# Patient Record
Sex: FEMALE | Race: WHITE | NOT HISPANIC OR LATINO | Employment: OTHER | URBAN - NONMETROPOLITAN AREA
[De-identification: names, ages, dates, MRNs, and addresses within clinical notes are randomized per-mention and may not be internally consistent; named-entity substitution may affect disease eponyms.]

---

## 2024-04-22 ENCOUNTER — OFFICE VISIT (OUTPATIENT)
Dept: URGENT CARE | Facility: CLINIC | Age: 79
End: 2024-04-22
Payer: MEDICARE

## 2024-04-22 VITALS
TEMPERATURE: 98.2 F | DIASTOLIC BLOOD PRESSURE: 84 MMHG | OXYGEN SATURATION: 98 % | RESPIRATION RATE: 20 BRPM | SYSTOLIC BLOOD PRESSURE: 188 MMHG | HEART RATE: 106 BPM

## 2024-04-22 DIAGNOSIS — M54.42 ACUTE LEFT-SIDED LOW BACK PAIN WITH LEFT-SIDED SCIATICA: Primary | ICD-10-CM

## 2024-04-22 PROCEDURE — G0463 HOSPITAL OUTPT CLINIC VISIT: HCPCS

## 2024-04-22 PROCEDURE — 99203 OFFICE O/P NEW LOW 30 MIN: CPT

## 2024-04-22 RX ORDER — METOPROLOL SUCCINATE 50 MG/1
50 TABLET, EXTENDED RELEASE ORAL DAILY
COMMUNITY
Start: 2024-02-08

## 2024-04-22 RX ORDER — METHYLPREDNISOLONE 4 MG/1
TABLET ORAL
Qty: 21 TABLET | Refills: 0 | Status: SHIPPED | OUTPATIENT
Start: 2024-04-22

## 2024-04-22 RX ORDER — BENAZEPRIL HYDROCHLORIDE 40 MG/1
40 TABLET ORAL DAILY
COMMUNITY
Start: 2024-02-08

## 2024-04-22 RX ORDER — CYCLOBENZAPRINE HCL 5 MG
5 TABLET ORAL 3 TIMES DAILY PRN
Qty: 28 TABLET | Refills: 0 | Status: SHIPPED | OUTPATIENT
Start: 2024-04-22

## 2024-04-22 RX ORDER — AMLODIPINE BESYLATE 10 MG/1
10 TABLET ORAL DAILY
COMMUNITY
Start: 2024-02-08

## 2024-04-22 NOTE — PROGRESS NOTES
[facility]        NAME: Holly Raygoza is a 78 y.o. female  : 1945    MRN: [id number]  DATE: 2024  TIME: 11:29 AM    Assessment and Plan   Acute left-sided low back pain with left-sided sciatica [M54.42]  1. Acute left-sided low back pain with left-sided sciatica  cyclobenzaprine (FLEXERIL) 5 mg tablet    methylPREDNISolone 4 MG tablet therapy pack    Ambulatory Referral to Physical Therapy        Left lower back pain radiating down left leg since Thursday of last week.  No relief with Tylenol and ibuprofen over-the-counter.  There is mild tenderness in the left lower lumbar region with positive straight leg raise on the left.  Equal sensation, strength, reflexes in bilateral lower extremities.  Will give trial of Flexeril and Medrol Dosepak.  Did give referral to physical therapy as well.  PT did have high blood pressure reading here in the clinic, she is on blood pressure medications per family doctor.  Patient states that is because she is very nervous and worked up over her back pain.  She does not like going to see doctors.  Advised to continue monitoring blood pressure at home and to go to the emergency room if continued elevation or if she experiences any dizziness, headache, visual changes, numbness/tingling, changes in speech.  Patient has been agreed with the plan of care and are willing to follow.    Patient Instructions     Take prescribed medication as instructed.  Muscle relaxer (Flexeril) may make you drowsy.  Do not take prior to driving or operating machinery.  Be cautious at home when moving around.  May take later in the evening.  Continue monitoring blood pressure.  Follow-up with your family doctor regarding elevated blood pressure here today.  May continue with TENS unit at home.  Warm compresses 4-5 times a day for 10 to 15 minutes to the left lower back.  Follow-up with physical therapy as discussed.  Follow up with PCP in 3-5 days.  Proceed to  ER if symptoms  worsen such as severe pain, worsening of numbness/tingling/weakness, bowel/bladder incontinence, headache, visual changes.    If tests are performed, our office will contact you with results only if changes need to made to the care plan discussed with you at the visit. You can review your full results on [facility].    Chief Complaint     Chief Complaint   Patient presents with    Back Pain     Down left leg started Thursday.  States can't walk.           History of Present Illness       78-year-old female here with  for left lower back pain with pain radiating down her left leg since Thursday of last week.  No recent injury or fall.   states that she was busy doing some yard work around the house last week.  Denies frequent back issues or prior back injuries/surgeries.  Taking Tylenol/ibuprofen over-the-counter.  Patient was doing TENS unit and applying ice.  Denies any fever, chills, chest pain, shortness of breath, urinary symptoms, bowel/bladder incontinence, saddle anesthesia.    Back Pain        Review of Systems   Review of Systems   Constitutional: Negative.    HENT: Negative.     Respiratory: Negative.     Cardiovascular: Negative.    Genitourinary: Negative.    Musculoskeletal:  Positive for back pain (with sciatica down left leg).   Skin: Negative.    Neurological: Negative.          Current Medications       Current Outpatient Medications:     amLODIPine (NORVASC) 10 mg tablet, Take 10 mg by mouth daily, Disp: , Rfl:     benazepril (LOTENSIN) 40 MG tablet, Take 40 mg by mouth daily, Disp: , Rfl:     cholecalciferol 1,000 units tablet, Take 1,000 Units by mouth daily, Disp: , Rfl:     cyclobenzaprine (FLEXERIL) 5 mg tablet, Take 1 tablet (5 mg total) by mouth 3 (three) times a day as needed for muscle spasms, Disp: 28 tablet, Rfl: 0    methylPREDNISolone 4 MG tablet therapy pack, Use as directed on package, Disp: 21 tablet, Rfl: 0    metoprolol succinate (TOPROL-XL) 50 mg 24 hr  tablet, Take 50 mg by mouth daily, Disp: , Rfl:     Current Allergies     Allergies as of 04/22/2024    (No Known Allergies)            The following portions of the patient's history were reviewed and updated as appropriate: allergies, current medications, past family history, past medical history, past social history, past surgical history and problem list.     Past Medical History:   Diagnosis Date    Vertigo        History reviewed. No pertinent surgical history.    No family history on file.      Medications have been verified.        Objective   BP (!) 188/84   Pulse (!) 106   Temp 98.2 °F (36.8 °C)   Resp 20   SpO2 98%        Physical Exam     Physical Exam  Constitutional:       General: She is not in acute distress.     Appearance: Normal appearance. She is not ill-appearing or diaphoretic.   HENT:      Head: Normocephalic and atraumatic.      Mouth/Throat:      Mouth: Mucous membranes are moist.      Pharynx: Oropharynx is clear.   Cardiovascular:      Rate and Rhythm: Normal rate and regular rhythm.      Pulses: Normal pulses.      Heart sounds: Normal heart sounds.   Pulmonary:      Effort: Pulmonary effort is normal. No respiratory distress.      Breath sounds: No stridor. No wheezing, rhonchi or rales.   Chest:      Chest wall: No tenderness.   Musculoskeletal:      Cervical back: Normal range of motion and neck supple. No rigidity or tenderness.      Lumbar back: Spasms and tenderness present. No swelling, edema, deformity, signs of trauma or lacerations. Decreased range of motion (Secondary to pain). Positive left straight leg raise test. Negative right straight leg raise test.        Back:       Comments: Equal sensation to light touch in bilateral lower extremities.  Normal range of motion and strength against resistance of the feet/ankles, knees, hips.  No rash.   Skin:     General: Skin is warm and dry.      Capillary Refill: Capillary refill takes less than 2 seconds.      Coloration: Skin  is not pale.      Findings: No bruising, erythema or rash.   Neurological:      General: No focal deficit present.      Mental Status: She is alert and oriented to person, place, and time.      Cranial Nerves: No cranial nerve deficit.      Sensory: No sensory deficit.      Motor: No weakness.      Coordination: Coordination normal.      Gait: Gait abnormal (sitting in wheel chair due to back pain/shooting pain down left leg).      Deep Tendon Reflexes: Reflexes normal.   Psychiatric:         Mood and Affect: Mood normal.         Behavior: Behavior normal.

## 2024-04-22 NOTE — PATIENT INSTRUCTIONS
Take prescribed medication as instructed.  Muscle relaxer (Flexeril) may make you drowsy.  Do not take prior to driving or operating machinery.  Be cautious at home when moving around.  May take later in the evening.  Continue monitoring blood pressure.  Follow-up with your family doctor regarding elevated blood pressure here today.  May continue with TENS unit at home.  Warm compresses 4-5 times a day for 10 to 15 minutes to the left lower back.  Follow-up with physical therapy as discussed.  Follow up with PCP in 3-5 days.  Proceed to  ER if symptoms worsen such as severe pain, worsening of numbness/tingling/weakness, bowel/bladder incontinence, headache, visual changes.    If tests are performed, our office will contact you with results only if changes need to made to the care plan discussed with you at the visit. You can review your full results on [facility].  Sciatica   WHAT YOU NEED TO KNOW:   Sciatica is a condition that causes pain along your sciatic nerve. The sciatic nerve runs from your spine through both sides of your buttocks. It then runs down the back of your thigh, into your lower leg and foot. Any place along your sciatic nerve may be compressed, inflamed, irritated, or stretched.       DISCHARGE INSTRUCTIONS:   Return to the emergency department if:   You have trouble controlling your urine or bowel movements.    You have weakness in both legs.    You have numbness in your groin or buttocks.    Call your doctor if:   You have pain in your lower back at night or when resting.    You have pain in your lower back with numbness below the knee.    You have weakness in one leg only.    You have questions or concerns about your condition or care.    Medicines:  You may need any of the following:  NSAIDs , such as ibuprofen, help decrease swelling, pain, and fever. This medicine is available with or without a doctor's order. NSAIDs can cause stomach bleeding or kidney problems in certain people.  If you take blood thinner medicine, always ask your healthcare provider if NSAIDs are safe for you. Always read the medicine label and follow directions.    Acetaminophen  decreases pain and fever. It is available without a doctor's order. Ask how much to take and how often to take it. Follow directions. Read the labels of all other medicines you are using to see if they also contain acetaminophen, or ask your doctor or pharmacist. Acetaminophen can cause liver damage if not taken correctly.    Muscle relaxers  help decrease pain and muscle spasms.    Take your medicine as directed.  Contact your healthcare provider if you think your medicine is not helping or if you have side effects. Tell your provider if you are allergic to any medicine. Keep a list of the medicines, vitamins, and herbs you take. Include the amounts, and when and why you take them. Bring the list or the pill bottles to follow-up visits. Carry your medicine list with you in case of an emergency.    Manage your symptoms:   Decrease your activity as directed.  Do not lift heavy objects or twist your back for at least 6 weeks. Slowly return to your usual activity.    Apply ice to help decrease swelling and pain.  Use an ice pack, or put crushed ice in a plastic bag. Cover it with a towel before you place it on your back or leg. Apply ice for 15 to 20 minutes every hour, or as directed.    Apply heat to help decrease pain and muscle spasms.  Use a heat pack or a heating pad set on low heat. Apply heat on your back or leg for 20 to 30 minutes every 2 hours for as many days as directed.    Go to physical or occupational therapy as directed.  A physical therapist teaches you exercises to help improve movement and strength, and to decrease pain. An occupational therapist teaches you skills to help with your daily activities.    Use assistive devices, if directed.  You may need to wear back support, such as a back brace. You may need crutches, a cane, or a  walker to decrease stress on your lower back and leg muscles. Ask your healthcare provider for more information about assistive devices and how to use them correctly.    Prevent sciatica:   Avoid pressure on your back and legs.  Do not  lift heavy objects, or stand or sit for long periods of time.    Lift objects safely.  Keep your back straight and bend your knees when you  an object. Do not bend or twist your back when you lift.         Maintain a healthy weight.  Ask your healthcare provider what a healthy weight is for you. Your provider can help you create a weight loss plan, if needed.    Exercise as directed.  Ask your healthcare provider about the best stretching, warmup, and exercise plan for you.    Follow up with your doctor as directed:  Write down your questions so you remember to ask them during your visits.  © Copyright [employer] 2023 Information is for End User's use only and may not be sold, redistributed or otherwise used for commercial purposes.  The above information is an  only. It is not intended as medical advice for individual conditions or treatments. Talk to your doctor, nurse or pharmacist before following any medical regimen to see if it is safe and effective for you.